# Patient Record
Sex: MALE | Race: WHITE | NOT HISPANIC OR LATINO | Employment: FULL TIME | URBAN - METROPOLITAN AREA
[De-identification: names, ages, dates, MRNs, and addresses within clinical notes are randomized per-mention and may not be internally consistent; named-entity substitution may affect disease eponyms.]

---

## 2019-04-19 ENCOUNTER — OFFICE VISIT (OUTPATIENT)
Dept: FAMILY MEDICINE | Facility: CLINIC | Age: 34
End: 2019-04-19
Payer: COMMERCIAL

## 2019-04-19 VITALS
SYSTOLIC BLOOD PRESSURE: 127 MMHG | OXYGEN SATURATION: 96 % | DIASTOLIC BLOOD PRESSURE: 77 MMHG | HEART RATE: 71 BPM | WEIGHT: 190.3 LBS | HEIGHT: 69 IN | TEMPERATURE: 98.8 F | BODY MASS INDEX: 28.19 KG/M2

## 2019-04-19 DIAGNOSIS — Z13.1 SCREENING FOR DIABETES MELLITUS: ICD-10-CM

## 2019-04-19 DIAGNOSIS — Z13.5 SCREENING FOR EYE CONDITION: ICD-10-CM

## 2019-04-19 DIAGNOSIS — Z00.00 ROUTINE GENERAL MEDICAL EXAMINATION AT A HEALTH CARE FACILITY: Primary | ICD-10-CM

## 2019-04-19 DIAGNOSIS — Z13.6 SCREENING FOR CARDIOVASCULAR CONDITION: ICD-10-CM

## 2019-04-19 LAB
CHOLEST SERPL-MCNC: 203 MG/DL
GLUCOSE SERPL-MCNC: 88 MG/DL (ref 70–99)
HDLC SERPL-MCNC: 73 MG/DL
LDLC SERPL CALC-MCNC: 110 MG/DL
NONHDLC SERPL-MCNC: 130 MG/DL
TRIGL SERPL-MCNC: 100 MG/DL

## 2019-04-19 PROCEDURE — 82947 ASSAY GLUCOSE BLOOD QUANT: CPT | Performed by: FAMILY MEDICINE

## 2019-04-19 PROCEDURE — 80061 LIPID PANEL: CPT | Performed by: FAMILY MEDICINE

## 2019-04-19 PROCEDURE — 99385 PREV VISIT NEW AGE 18-39: CPT | Performed by: FAMILY MEDICINE

## 2019-04-19 PROCEDURE — 36415 COLL VENOUS BLD VENIPUNCTURE: CPT | Performed by: FAMILY MEDICINE

## 2019-04-19 ASSESSMENT — ENCOUNTER SYMPTOMS
JOINT SWELLING: 0
HEARTBURN: 0
MYALGIAS: 0
HEADACHES: 0
NERVOUS/ANXIOUS: 0
NAUSEA: 0
FEVER: 0
ABDOMINAL PAIN: 0
DIZZINESS: 0
CONSTIPATION: 0
DYSURIA: 0
HEMATURIA: 0
ARTHRALGIAS: 0
SHORTNESS OF BREATH: 0
EYE PAIN: 0
DIARRHEA: 0
SORE THROAT: 0
PALPITATIONS: 0
WEAKNESS: 0
PARESTHESIAS: 0
CHILLS: 0
COUGH: 0
FREQUENCY: 0
HEMATOCHEZIA: 0

## 2019-04-19 ASSESSMENT — MIFFLIN-ST. JEOR: SCORE: 1803.58

## 2019-04-19 NOTE — PROGRESS NOTES
SUBJECTIVE:   CC: Federico Prado is an 33 year old male who presents for preventative health visit.     Healthy Habits:     Getting at least 3 servings of Calcium per day:  Yes    Bi-annual eye exam:  NO    Dental care twice a year:  Yes    Sleep apnea or symptoms of sleep apnea:  None    Diet:  Vegetarian/vegan    Frequency of exercise:  2-3 days/week    Duration of exercise:  30-45 minutes    Taking medications regularly:  Not Applicable    Medication side effects:  Not applicable    PHQ-2 Total Score: 0    Additional concerns today:  No      Today's PHQ-2 Score:   PHQ-2 ( 1999 [employer]) 4/19/2019   Q1: Little interest or pleasure in doing things 0   Q2: Feeling down, depressed or hopeless 0   PHQ-2 Score 0   Q1: Little interest or pleasure in doing things Not at all   Q2: Feeling down, depressed or hopeless Not at all   PHQ-2 Score 0       Abuse: Current or Past(Physical, Sexual or Emotional)- No  Do you feel safe in your environment? Yes    Social History     Tobacco Use     Smoking status: Never Smoker     Smokeless tobacco: Never Used   Substance Use Topics     Alcohol use: Yes     If you drink alcohol do you typically have >3 drinks per day or >7 drinks per week? No    Alcohol Use 4/19/2019   Prescreen: >3 drinks/day or >7 drinks/week? No   No flowsheet data found.    Last PSA: No results found for: PSA    Reviewed orders with patient. Reviewed health maintenance and updated orders accordingly - Yes  Labs reviewed in EPIC    Reviewed and updated as needed this visit by clinical staff  Tobacco  Allergies  Meds  Med Hx  Surg Hx  Fam Hx  Soc Hx        Reviewed and updated as needed this visit by Provider        Moved here. [place] to [place].  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   Lives with his partner. Thinking to have a child in future.   - exercise - freesby, goes to gym.   Right handed. Right pinky somewhat painful after injury.   - moles on back.       Review of Systems   Constitutional: Negative for chills  "and fever.   HENT: Negative for congestion, ear pain, hearing loss and sore throat.    Eyes: Negative for pain and visual disturbance.   Respiratory: Negative for cough and shortness of breath.    Cardiovascular: Negative for chest pain, palpitations and peripheral edema.   Gastrointestinal: Negative for abdominal pain, constipation, diarrhea, heartburn, hematochezia and nausea.   Genitourinary: Negative for discharge, dysuria, frequency, genital sores, hematuria, impotence and urgency.   Musculoskeletal: Negative for arthralgias, joint swelling and myalgias.   Skin: Negative for rash.   Neurological: Negative for dizziness, weakness, headaches and paresthesias.   Psychiatric/Behavioral: Negative for mood changes. The patient is not nervous/anxious.        OBJECTIVE:   /77   Pulse 71   Temp 98.8  F (37.1  C) (Oral)   Ht 1.753 m (5' 9\")   Wt 86.3 kg (190 lb 4.8 oz)   SpO2 96%   BMI 28.10 kg/m      Physical Exam  GENERAL: healthy, alert and no distress  EYES: Eyes grossly normal to inspection, PERRL and conjunctivae and sclerae normal, left medial canthus there is a tiny prominence present.  HENT: ear canals and TM's normal, nose and mouth without ulcers or lesions  NECK: no adenopathy, no asymmetry, masses, or scars and thyroid normal to palpation  RESP: lungs clear to auscultation - no rales, rhonchi or wheezes  CV: regular rate and rhythm, normal S1 S2, no S3 or S4, no murmur, click or rub, no peripheral edema and peripheral pulses strong  ABDOMEN: soft, nontender, no hepatosplenomegaly, no masses and bowel sounds normal   (male): normal male genitalia without lesions or urethral discharge, no hernia  MS: no gross musculoskeletal defects noted, no edema  SKIN: no suspicious lesions or rashes  NEURO: Normal strength and tone, mentation intact and speech normal  PSYCH: mentation appears normal, affect normal/bright      ASSESSMENT/PLAN:   1. Routine general medical examination at a [facility]" "facility      2. Screening for diabetes mellitus     - Glucose    3. Screening for cardiovascular condition     - Lipid panel reflex to direct LDL Fasting    4. Screening for eye condition     - OPHTHALMOLOGY ADULT REFERRAL    COUNSELING:   Reviewed preventive health counseling, as reflected in patient instructions  Special attention given to:        Regular exercise       Healthy diet/nutrition       Vision screening       Hearing screening    BP Readings from Last 1 Encounters:   04/19/19 127/77     Estimated body mass index is 28.1 kg/m  as calculated from the following:    Height as of this encounter: 1.753 m (5' 9\").    Weight as of this encounter: 86.3 kg (190 lb 4.8 oz).      Weight management plan: Discussed healthy diet and exercise guidelines     reports that he has never smoked. He has never used smokeless tobacco.      Counseling Resources:  ATP IV Guidelines  Pooled Cohorts Equation Calculator  FRAX Risk Assessment  ICSI Preventive Guidelines  Dietary Guidelines for Americans, 2010  USDA's MyPlate  ASA Prophylaxis  Lung CA Screening    Clinton Celeste MD, MD  [facility]  "

## 2019-04-19 NOTE — LETTER
April 23, 2019      Federico Prado    [address]  [place] [address]        Dear Federico,    Here are your recent test results:    Your diabetes test is within normal limits.  Your cholesterol is also okay.  Results for orders placed or performed in visit on 04/19/19   Lipid panel reflex to direct LDL Fasting   Result Value Ref Range    Cholesterol 203 (H) <200 mg/dL    Triglycerides 100 <150 mg/dL    HDL Cholesterol 73 >39 mg/dL    LDL Cholesterol Calculated 110 (H) <100 mg/dL    Non HDL Cholesterol 130 (H) <130 mg/dL   Glucose   Result Value Ref Range    Glucose 88 70 - 99 mg/dL               Sincerely,        Clinton Celeste MD/rose marie

## 2020-02-03 ENCOUNTER — OFFICE VISIT (OUTPATIENT)
Dept: FAMILY MEDICINE | Facility: CLINIC | Age: 35
End: 2020-02-03
Payer: COMMERCIAL

## 2020-02-03 VITALS
SYSTOLIC BLOOD PRESSURE: 107 MMHG | TEMPERATURE: 100.2 F | HEIGHT: 68 IN | OXYGEN SATURATION: 98 % | HEART RATE: 87 BPM | WEIGHT: 185.75 LBS | BODY MASS INDEX: 28.15 KG/M2 | DIASTOLIC BLOOD PRESSURE: 61 MMHG | RESPIRATION RATE: 15 BRPM

## 2020-02-03 DIAGNOSIS — R68.89 FLU-LIKE SYMPTOMS: ICD-10-CM

## 2020-02-03 DIAGNOSIS — J10.1 INFLUENZA A: Primary | ICD-10-CM

## 2020-02-03 DIAGNOSIS — J02.9 SORE THROAT: ICD-10-CM

## 2020-02-03 LAB
DEPRECATED S PYO AG THROAT QL EIA: NORMAL
FLUAV+FLUBV AG SPEC QL: NEGATIVE
FLUAV+FLUBV AG SPEC QL: POSITIVE
SPECIMEN SOURCE: ABNORMAL
SPECIMEN SOURCE: NORMAL

## 2020-02-03 PROCEDURE — 99213 OFFICE O/P EST LOW 20 MIN: CPT | Performed by: FAMILY MEDICINE

## 2020-02-03 PROCEDURE — 87804 INFLUENZA ASSAY W/OPTIC: CPT | Performed by: FAMILY MEDICINE

## 2020-02-03 PROCEDURE — 87081 CULTURE SCREEN ONLY: CPT | Performed by: FAMILY MEDICINE

## 2020-02-03 PROCEDURE — 87880 STREP A ASSAY W/OPTIC: CPT | Performed by: FAMILY MEDICINE

## 2020-02-03 RX ORDER — OSELTAMIVIR PHOSPHATE 75 MG/1
75 CAPSULE ORAL 2 TIMES DAILY
Qty: 10 CAPSULE | Refills: 0 | Status: SHIPPED | OUTPATIENT
Start: 2020-02-03 | End: 2020-02-08

## 2020-02-03 SDOH — HEALTH STABILITY: MENTAL HEALTH: HOW OFTEN DO YOU HAVE A DRINK CONTAINING ALCOHOL?: 4 OR MORE TIMES A WEEK

## 2020-02-03 ASSESSMENT — MIFFLIN-ST. JEOR: SCORE: 1754.12

## 2020-02-03 NOTE — PROGRESS NOTES
Subjective     Federico Prado is a 34 year old male who presents to clinic today for the following health issues:    HPI   RESPIRATORY SYMPTOMS    Duration: 24-36 hrs     Description  sore throat, cough, wheezing, fever, chills, headache, fatigue/malaise, hoarse voice and myalgias    Severity: severe    Accompanying signs and symptoms:  Body aches     History (predisposing factors):  none    Precipitating or alleviating factors: None    Therapies tried and outcome:  acetaminophen    Prior left shoulder arthroscopy. Seen by PCP Dr Celeste 4/19/19 for a physical. Here for possible flu / strep  Reports was well 2 days ago then yesterday a.m. woke up with a headache and by evening and a high fever.  Felt chilled through the night despite 6 blankets and since then has felt feverish, sore throat body aches exhausted trouble sleeping and coughing.  Throat feels irritated.  Did get the flu shot in the fall.  Last took Tylenol 500 mg at 8:30 AM temperature currently 100.2.  Has had no recent travel no sick contacts lives with his significant other exposed to a lot of people.  Is generally very healthy.  No regular meds and no allergies.    There is no problem list on file for this patient.    Past Surgical History:   Procedure Laterality Date     left shoulder arthroscopic surgery Left 2006       Social History     Tobacco Use     Smoking status: Never Smoker     Smokeless tobacco: Never Used   Substance Use Topics     Alcohol use: Yes     Frequency: 4 or more times a week     History reviewed. No pertinent family history.      Current Outpatient Medications   Medication Sig Dispense Refill     oseltamivir (TAMIFLU) 75 MG capsule Take 1 capsule (75 mg) by mouth 2 times daily for 5 days 10 capsule 0     No Known Allergies  Recent Labs   Lab Test 04/19/19  0834   *   HDL 73   TRIG 100      BP Readings from Last 3 Encounters:   02/03/20 107/61   04/19/19 127/77    Wt Readings from Last 3 Encounters:   02/03/20 84.3 kg (185  "lb 12 oz)   04/19/19 86.3 kg (190 lb 4.8 oz)         Reviewed and updated as needed this visit by Provider  Tobacco  Allergies  Meds  Problems  Med Hx  Surg Hx  Fam Hx  Soc Hx          Review of Systems   ROS COMP: Constitutional, HEENT, cardiovascular, pulmonary, GI, , musculoskeletal, neuro, skin, endocrine and psych systems are negative, except as otherwise noted.      Objective    /61 (BP Location: Left arm, Patient Position: Chair, Cuff Size: Adult Large)   Pulse 87   Temp 100.2  F (37.9  C) (Oral)   Resp 15   Ht 1.715 m (5' 7.5\")   Wt 84.3 kg (185 lb 12 oz)   SpO2 98%   BMI 28.66 kg/m    Body mass index is 28.66 kg/m .  Physical Exam   GENERAL: healthy, alert, no distress and fatigued  EYES: Eyes grossly normal to inspection, PERRL and conjunctivae and sclerae normal  HENT: normal cephalic/atraumatic, both ears: clear effusion, nose and mouth without ulcers or lesions, nasal mucosa edematous , rhinorrhea clear, oropharynx clear, oral mucous membranes moist and sinuses: not tender  NECK: no adenopathy, no asymmetry, masses, or scars and thyroid normal to palpation  RESP: lungs clear to auscultation - no rales, rhonchi or wheezes  CV: regular rate and rhythm, normal S1 S2, no S3 or S4, no murmur, click or rub, no peripheral edema and peripheral pulses strong  ABDOMEN: soft, non tender, no hepatosplenomegaly, no masses and bowel sounds normal  MS: no gross musculoskeletal defects noted, no edema  SKIN: no suspicious lesions or rashes  NEURO: Normal strength and tone, mentation intact and speech normal  PSYCH: mentation appears normal, fatigued, judgement and insight intact and appearance well groomed    Diagnostic Test Results:  Labs reviewed in Epic  Results for orders placed or performed in visit on 02/03/20 (from the past 24 hour(s))   Strep, Rapid Screen   Result Value Ref Range    Specimen Description Throat     Rapid Strep A Screen       NEGATIVE: No Group A streptococcal antigen " "detected by immunoassay, await culture report.   Influenza A/B antigen   Result Value Ref Range    Influenza A/B Agn Specimen Nasopharyngeal     Influenza A Positive (A) NEG^Negative    Influenza B Negative NEG^Negative           Assessment & Plan       ICD-10-CM    1. Influenza A J10.1 Influenza A/B antigen   2. Flu-like symptoms R68.89 Influenza A/B antigen     oseltamivir (TAMIFLU) 75 MG capsule   3. Sore throat J02.9 Strep, Rapid Screen     Beta strep group A culture     Swabs done . He was negative for strep but has influenza A.  Currently hemodynamically stable, vitals appropriate, lungs are clear with no sign of pneumonia.  Has no high risk factors.  Offered antiviral given is in the first 72 hours of illness, agreeable to taking & side effects explained. Given Tamiflu twice a day for 5 days with food.  Encouraged supportive care.  Alternate Tylenol at least 1000 mg up to 3 times a day with ibuprofen 400-600 every 6 hours to help with symptoms.    Recommend flu shot yearly   Tdap due if not utd when better  Return in April to PCP Dr Celeste for a physical     BMI:   Estimated body mass index is 28.66 kg/m  as calculated from the following:    Height as of this encounter: 1.715 m (5' 7.5\").    Weight as of this encounter: 84.3 kg (185 lb 12 oz).   Weight management plan: Patient was referred to their PCP to discuss a diet and exercise plan.  See Patient Instructions    No follow-ups on file.    Mine Hubbard MD  [facility]        "

## 2020-02-03 NOTE — PATIENT INSTRUCTIONS
Neg for strep  Has the flu  Alternate acetaminophen and ibuprofe   tamiflu twice a day for 5 day with food  Can cause nausea, headache, abdominal pain, vomiting    Patient Education     Influenza (Adult)    Influenza is also called the flu. It is a viral illness that affects the air passages of your lungs. It is different from the common cold. The flu can easily be passed from one to person to another. It may be spread through the air by coughing and sneezing. Or it can be spread by touching the sick person and then touching your own eyes, nose, or mouth.  The flu starts 1 to 3 days after you are exposed to the flu virus. It may last for 1 to 2 weeks but many people feel tired or fatigued for many weeks afterward. You usually don t need to take antibiotics unless you have a complication. This might be an ear or sinus infection or pneumonia.  Symptoms of the flu may be mild or severe. They can include extreme tiredness (wanting to stay in bed all day), chills, fevers, muscle aches, soreness with eye movement, headache, and a dry, hacking cough.  Home care  Follow these guidelines when caring for yourself at home:    Avoid being around cigarette smoke, whether yours or other people s.    Acetaminophen or ibuprofen will help ease your fever, muscle aches, and headache. Don t give aspirin to anyone younger than 18 who has the flu. Aspirin can harm the liver.    Nausea and loss of appetite are common with the flu. Eat light meals. Drink 6 to 8 glasses of liquids every day. Good choices are water, sport drinks, soft drinks without caffeine, juices, tea, and soup. Extra fluids will also help loosen secretions in your nose and lungs.    Over-the-counter cold medicines will not make the flu go away faster. But the medicines may help with coughing, sore throat, and congestion in your nose and sinuses. Don t use a decongestant if you have high blood pressure.    Stay home until your fever has been gone for at least 24 hours  without using medicine to reduce fever.  Follow-up care  Follow up with your healthcare provider, or as advised, if you are not getting better over the next week.  If you are age 65 or older, talk with your provider about getting a pneumococcal vaccine every 5 years. You should also get this vaccine if you have chronic asthma or COPD. All adults should get a flu vaccine every fall. Ask your provider about this.  When to seek medical advice  Call your healthcare provider right away if any of these occur:    Cough with lots of colored mucus (sputum) or blood in your mucus    Chest pain, shortness of breath, wheezing, or trouble breathing    Severe headache, or face, neck, or ear pain    New rash with fever    Fever of 100.4 F (38 C) or higher, or as directed by your healthcare provider    Confusion, behavior change, or seizure    Severe weakness or dizziness    You get a new fever or cough after getting better for a few days  Date Last Reviewed: 1/1/2017 2000-2019 [employer]. [address] [place] [address], [place] [address]. All rights reserved. This information is not intended as a substitute for professional medical care. Always follow your healthcare professional's instructions.

## 2020-02-04 LAB
BACTERIA SPEC CULT: NORMAL
SPECIMEN SOURCE: NORMAL

## 2021-07-27 NOTE — TELEPHONE ENCOUNTER
FUTURE VISIT INFORMATION      FUTURE VISIT INFORMATION:    Date: 8/23/21    Time: 8:00am    Location: [facility]  REFERRAL INFORMATION:    Referring provider:  Clinton Celeste MD    Referring providers clinic:  [facility]    Reason for visit/diagnosis  Growth in corner of Lt eye, next to nose    RECORDS REQUESTED FROM:       Clinic name Comments Records Status Imaging Status   [facility]  Ov/referral 7/19/19 EPIC

## 2021-08-15 ENCOUNTER — HEALTH MAINTENANCE LETTER (OUTPATIENT)
Age: 36
End: 2021-08-15

## 2021-08-23 ENCOUNTER — PRE VISIT (OUTPATIENT)
Dept: OPHTHALMOLOGY | Facility: CLINIC | Age: 36
End: 2021-08-23

## 2021-08-23 ENCOUNTER — OFFICE VISIT (OUTPATIENT)
Dept: OPHTHALMOLOGY | Facility: CLINIC | Age: 36
End: 2021-08-23
Payer: COMMERCIAL

## 2021-08-23 DIAGNOSIS — H57.9 LESION OF EYE: Primary | ICD-10-CM

## 2021-08-23 PROCEDURE — 99204 OFFICE O/P NEW MOD 45 MIN: CPT | Mod: GC | Performed by: OPHTHALMOLOGY

## 2021-08-23 ASSESSMENT — EXTERNAL EXAM - LEFT EYE: OS_EXAM: NORMAL

## 2021-08-23 ASSESSMENT — VISUAL ACUITY
OS_SC: 20/20
METHOD: SNELLEN - LINEAR
OD_SC: 20/20

## 2021-08-23 ASSESSMENT — TONOMETRY
IOP_METHOD: ICARE
OS_IOP_MMHG: 10
OD_IOP_MMHG: 10

## 2021-08-23 ASSESSMENT — SLIT LAMP EXAM - LIDS: COMMENTS: NORMAL

## 2021-08-23 ASSESSMENT — CONF VISUAL FIELD
OD_NORMAL: 1
OS_NORMAL: 1

## 2021-08-23 ASSESSMENT — EXTERNAL EXAM - RIGHT EYE: OD_EXAM: NORMAL

## 2021-08-23 NOTE — LETTER
2021         RE:  :  MRN: Federico Prado  1985  [id number]     Dear Dr. Odonnell,    Thank you for asking me to see your patient, Federico Prado, for an oculoplastic   consultation.  My assessment and plan are below.  For further details, please see my attached clinic note.           Assessment and Plan:   1. Left medial canthus lesion  - pink medial canthal papule first appeared 7 years ago and has doubled in size since then. Appears to have suddenly grown in size after an episode of rubbing it. It turns red for a few days and then eventually starts bleeding a little bit for 2-3 days. This has been happening every few weeks for a few years. When it is inflamed it becomes irritated and itchy, but when that resolves, it does not cause him any discomfort. No changes in tearing, denies dry eye, changes in vision, unintentional weight loss, night sweats, fevers.   - Exam: 5 W x 4 H mm pink papule with papillary appearance on left medial canthus with some dried blood adjacent. Normal medial canthus visible below. Punctum spared but papule adjacent to canaliculi.   - Lesion concerning for possible malignancy given friability, persistence, and growth    Plan:  - Lesion biopsy in clinic   - Follow up with pathology           Again, thank you for allowing me to participate in the care of your patient.      Sincerely,    Uriel Gray MD  Department of Ophthalmology and Visual Neurosciences  [facility]    CC: Clinton Celeste MD  [address] [address]  Via In Basket

## 2021-08-23 NOTE — PROGRESS NOTES
"Oculoplastic Clinic New Patient    Patient: Federico Prado MRN# [id number]   YOB: 1985 Age: 35 year old   Date of Visit: Aug 23, 2021         CC: Eyelid lesion    Chief Complaint(s) and History of Present Illness(es)     Consult For     Laterality: left eye    Associated symptoms: Negative for redness, eye pain, itching, discharge   and burning    Pain scale: 0/10    Comments: \"growth in corner of the LE\"              Comments     Pt notes that he has a spot on the LE that has been present for about 7-8   years, feels it has doubled in size when it was first present, he notes   about qo week that the spot will get \"dry and hard\" and then bleed for a   few days, and then \"go to normal\" and then happens again in another week   or two. He notes that it is irritating and not pleasant to look at. Denies   gtts or carina. Last eye exam was about 2012    [facility] August 23, 2021 8:02 AM                     HPI:     Federico Prado is a 35 year old male who has noted a lesion on the left medial canthus. It has been present for 7 years. This has not been biopsied.    Enlarging: Yes  Irritating to eyelashes and catches in folds of skin: Yes  Bleeding: Yes  Prior cutaneous malignancy: No  Immunosuppression: No           Assessment and Plan:   1. Left medial canthus lesion  - pink medial canthal papule first appeared 7 years ago and has doubled in size since then. Appears to have suddenly grown in size after an episode of rubbing it. It turns red for a few days and then eventually starts bleeding a little bit for 2-3 days. This has been happening every few weeks for a few years. When it is inflamed it becomes irritated and itchy, but when that resolves, it does not cause him any discomfort. No changes in tearing, denies dry eye, changes in vision, unintentional weight loss, night sweats, fevers.   - Exam: 5 W x 4 H mm pink papule with papillary appearance on left medial canthus with some dried blood adjacent. " Normal medial canthus visible below. Punctum spared but papule adjacent to canaliculi.   - Lesion concerning for possible malignancy given friability, persistence, and growth    Plan:  - Lesion biopsy in clinic   - Follow up with pathology          Attending Physician Attestation:  I have seen and examined this patient with the resident .  I have confirmed and edited as necessary the chief complaint(s), history of present illness, review of systems, relevant history, and examination findings as documented by others.  I have personally reviewed the relevant tests, images, and reports as documented above.  I have confirmed and edited as necessary the assessment and plan and agree with this note.    - Uriel Gray MD 8:38 AM 8/23/2021    Today with Federico Prado, I reviewed the indications, risks, benefits, and alternatives of the proposed surgical procedure including, but not limited to, failure obtain the desired result  and need for additional surgery, bleeding, infection, loss of vision, loss of the eye, and the remote possibility of permanent damage to any organ system or death with the use of anesthesia.  I provided multiple opportunities for the questions, answered all questions to the best of my ability, and confirmed that my answers and my discussion were understood.     - Uriel Gray MD 8:38 AM 8/23/2021

## 2021-08-23 NOTE — NURSING NOTE
"Chief Complaints and History of Present Illnesses   Patient presents with     Consult For     \"growth in corner of the LE\"     Chief Complaint(s) and History of Present Illness(es)     Consult For     Laterality: left eye    Associated symptoms: Negative for redness, eye pain, itching, discharge and burning    Pain scale: 0/10    Comments: \"growth in corner of the LE\"              Comments     Pt notes that he has a spot on the LE that has been present for about 7-8 years, feels it has doubled in size when it was first present, he notes about qo week that the spot will get \"dry and hard\" and then bleed for a few days, and then \"go to normal\" and then happens again in another week or two. He notes that it is irritating and not pleasant to look at. Denies gtts or carina. Last eye exam was about 2012    Ekaterina HAYWOOD August 23, 2021 8:02 AM                  "

## 2021-08-31 NOTE — TELEPHONE ENCOUNTER
FUTURE VISIT INFORMATION      FUTURE VISIT INFORMATION:    Date: 9/13/21    Time: 11:30am    Location: [facility]  REFERRAL INFORMATION:    Referring provider:  Clinton Celeste MD    Referring providers clinic:  [facility]    Reason for visit/diagnosis  Growth in corner of Lt eye, next to nose     RECORDS REQUESTED FROM:         Clinic name Comments Records Status Imaging Status   [facility]  Ov/referral 7/19/19 EPIC

## 2021-09-10 ENCOUNTER — TELEPHONE (OUTPATIENT)
Dept: OPHTHALMOLOGY | Facility: CLINIC | Age: 36
End: 2021-09-10

## 2021-09-10 NOTE — TELEPHONE ENCOUNTER
[facility] scheduling rescheduled today    Ricardo Harris, RN 3:35 PM 09/10/21          [facility] Center    Phone Message    May a detailed message be left on voicemail: yes     Reason for Call: Other: Pt needs to reschedule his procedure scheduled for 9/13/21. Please call Pt back to reschedule. Thank you.     Action Taken: Message routed to:  [facility] ([facility]): [facility]    Travel Screening: Not Applicable

## 2021-09-10 NOTE — TELEPHONE ENCOUNTER
Spoke with patient regarding rescheduling appointment on 9/13/2021 for a Procedure with .  Rescheduled patient accordingly and sent appointment letter to confirmed address. -Per Patient

## 2021-09-13 ENCOUNTER — PRE VISIT (OUTPATIENT)
Dept: OPHTHALMOLOGY | Facility: CLINIC | Age: 36
End: 2021-09-13

## 2021-09-13 NOTE — TELEPHONE ENCOUNTER
FUTURE VISIT INFORMATION      FUTURE VISIT INFORMATION:    Date: 10/4/31    Time: 3:00pm    Location: [facility]  REFERRAL INFORMATION:    Referring provider:  Clinton Celeste MD    Referring providers clinic:  [facility]    Reason for visit/diagnosis  Growth in corner of Lt eye, next to nose     RECORDS REQUESTED FROM:         Clinic name Comments Records Status Imaging Status   [facility]  Ov/referral 7/19/19 EPIC

## 2021-10-04 ENCOUNTER — OFFICE VISIT (OUTPATIENT)
Dept: OPHTHALMOLOGY | Facility: CLINIC | Age: 36
End: 2021-10-04
Payer: COMMERCIAL

## 2021-10-04 ENCOUNTER — PRE VISIT (OUTPATIENT)
Dept: OPHTHALMOLOGY | Facility: CLINIC | Age: 36
End: 2021-10-04

## 2021-10-04 DIAGNOSIS — H57.9 LESION OF EYE: Primary | ICD-10-CM

## 2021-10-04 DIAGNOSIS — H02.9 LESION OF LEFT EYELID: ICD-10-CM

## 2021-10-04 PROCEDURE — 88305 TISSUE EXAM BY PATHOLOGIST: CPT | Mod: 26 | Performed by: OPHTHALMOLOGY

## 2021-10-04 PROCEDURE — 88305 TISSUE EXAM BY PATHOLOGIST: CPT | Mod: TC | Performed by: STUDENT IN AN ORGANIZED HEALTH CARE EDUCATION/TRAINING PROGRAM

## 2021-10-04 PROCEDURE — 67840 REMOVE EYELID LESION: CPT | Mod: LT | Performed by: OPHTHALMOLOGY

## 2021-10-04 RX ORDER — ERYTHROMYCIN 5 MG/G
OINTMENT OPHTHALMIC ONCE
Status: COMPLETED | OUTPATIENT
Start: 2021-10-04 | End: 2021-10-04

## 2021-10-04 RX ORDER — LIDOCAINE HYDROCHLORIDE AND EPINEPHRINE 10; 10 MG/ML; UG/ML
1 INJECTION, SOLUTION INFILTRATION; PERINEURAL ONCE
Status: COMPLETED | OUTPATIENT
Start: 2021-10-04 | End: 2021-10-04

## 2021-10-04 RX ADMIN — LIDOCAINE HYDROCHLORIDE AND EPINEPHRINE 1 ML: 10; 10 INJECTION, SOLUTION INFILTRATION; PERINEURAL at 15:29

## 2021-10-04 RX ADMIN — ERYTHROMYCIN: 5 OINTMENT OPHTHALMIC at 15:28

## 2021-10-04 NOTE — PROGRESS NOTES
No chief complaint on file.    Chief Complaint(s) and History of Present Illness(es)     No visit information to display             Assessment & Plan     Federico Prado is a 36 year old male with the following diagnoses:   1. Lesion of eye    2. Lesion of left eyelid         Excisional biopsy today            Attending Physician Attestation:  Complete documentation of historical and exam elements from today's encounter can be found in the full encounter summary report (not reduplicated in this progress note).  I personally obtained the chief complaint(s) and history of present illness.  I confirmed and edited as necessary the review of systems, past medical/surgical history, family history, social history, and examination findings as documented by others; and I examined the patient myself.  I personally reviewed the relevant tests, images, and reports as documented above.  I formulated and edited as necessary the assessment and plan and discussed the findings and management plan with the patient and family.   -Uriel Gray MD  3:33 PM 10/4/2021    Today with Federico Prado, I reviewed the indications, risks, benefits, and alternatives of the proposed surgical procedure including, but not limited to, failure obtain the desired result  and need for additional surgery, bleeding, infection, loss of vision, loss of the eye, and the remote possibility of permanent damage to any organ system or death with the use of anesthesia.  I provided multiple opportunities for the questions, answered all questions to the best of my ability, and confirmed that my answers and my discussion were understood.     - Uriel Gray MD 3:34 PM 10/4/2021  3

## 2021-10-04 NOTE — PATIENT INSTRUCTIONS
Uriel Gray M.D.    POST OPERATIVE INSTRUCTIONS   OFFICE EYELID SURGERY      1. Ice pack immediately after surgery. Alternate ten minutes on and ten minutes off for the first 24 - 48 hours, while in a reclined position with two pillows under your head while awake.     2. You can use Tylenol extra strength for pain as directed on the bottle.     3. Sleep with two pillows under your head for the first night following surgery.     4.  If bandaged, remove the dressing 24 hours after surgery.     5. Use ointment along the incision both morning and evening until your return visit. If you get ointment in your eye, it will blur your vision slightly.     6. Avoid aspirin or anti-inflammatory medications such as Advil or Motrin for one week following your surgery unless otherwise directed.     7. Avoid strenuous activity or straining for the first week after surgery.     8. Bathing and showers are OK but to facilitate healing, do not wash or apply water to the sutured areas.     9. Small amounts of bright red blood normally stain the bandages. Some blurring of vision can be expected due to drainage and ointment in the eyes.     10. If your eyes swell shut, you cannot establish vision in the operated eye, or the pain is not reasonably controlled with medication you must contact the eye clinic immediately.     11. If you should have a problem after normal office hours, you can reach the ophthalmologist on call at [phone]. If for some reason no one can be reached, proceed to the nearest emergency room for evaluation and treatment.

## 2021-10-05 LAB
PATH REPORT.COMMENTS IMP SPEC: NORMAL
PATH REPORT.FINAL DX SPEC: NORMAL
PATH REPORT.GROSS SPEC: NORMAL
PATH REPORT.MICROSCOPIC SPEC OTHER STN: NORMAL
PATH REPORT.RELEVANT HX SPEC: NORMAL
PHOTO IMAGE: NORMAL

## 2021-10-10 ENCOUNTER — HEALTH MAINTENANCE LETTER (OUTPATIENT)
Age: 36
End: 2021-10-10

## 2022-09-18 ENCOUNTER — HEALTH MAINTENANCE LETTER (OUTPATIENT)
Age: 37
End: 2022-09-18

## 2023-10-08 ENCOUNTER — HEALTH MAINTENANCE LETTER (OUTPATIENT)
Age: 38
End: 2023-10-08

## 2024-08-20 ENCOUNTER — LAB (OUTPATIENT)
Dept: LAB | Facility: CLINIC | Age: 39
End: 2024-08-20
Payer: COMMERCIAL

## 2024-08-20 DIAGNOSIS — N46.9 MALE INFERTILITY: ICD-10-CM

## 2024-08-20 LAB
ABNORMAL SPERM MORPHOLOGY: 96
ABSTINENCE DAYS: 3.5 DAYS (ref 2–7)
AGGLUTINATION: NO
ANALYSIS TEMP - CENTIGRADE: 22 CENTIGRADE
COLLECTION METHOD: NORMAL
COLLECTION SITE: NORMAL
CONSENT TO RELEASE TO PARTNER: YES
DAL- RECEIVED TIME: NORMAL
HEAD DEFECT: 96 %
IMMOTILE: 20 %
LIQUEFIED: YES
MIDPIECE DEFECT: 43 %
NON-PROGRESSIVE MOTILITY: 1 %
NORMAL SPERM MORPHOLOGY: 4 % NORMAL FORMS
PROGRESSIVE MOTILITY: 79 %
ROUND CELLS: 0 MILLION/ML
SPECIMEN PH: 7.2 PH
SPECIMEN VOLUME: 3 ML
SPERM CONCENTRATION: 81.8 MILLION/ML
TAIL DEFECT: 2 %
TIME OF ANALYSIS: NORMAL
TOTAL PROGRESSIVE MOTILE NUMBER: 194 MILLION
TOTAL SPERM NUMBER: 245 MILLION
VISCOUS: NO
VITALITY: NORMAL

## 2024-08-20 PROCEDURE — 89322 SEMEN ANAL STRICT CRITERIA: CPT

## 2024-12-01 ENCOUNTER — HEALTH MAINTENANCE LETTER (OUTPATIENT)
Age: 39
End: 2024-12-01

## (undated) RX ORDER — LIDOCAINE HYDROCHLORIDE AND EPINEPHRINE 10; 10 MG/ML; UG/ML
INJECTION, SOLUTION INFILTRATION; PERINEURAL
Status: DISPENSED
Start: 2021-10-04

## (undated) RX ORDER — ERYTHROMYCIN 5 MG/G
OINTMENT OPHTHALMIC
Status: DISPENSED
Start: 2021-10-04